# Patient Record
(demographics unavailable — no encounter records)

---

## 2025-02-24 NOTE — HISTORY OF PRESENT ILLNESS
[FreeTextEntry1] : Dense breasts with fibrocystic changes  Patient denies any breast masses or nipple discharge.  Patient has some bilateral breast pain that is intermittent.  Patient is overdue for her films.  Last year's mammogram and ultrasound showed dense breast tissue with fibrocystic changes.  Patient has never had a breast biopsy, has no family history of breast cancer.